# Patient Record
Sex: FEMALE | URBAN - METROPOLITAN AREA
[De-identification: names, ages, dates, MRNs, and addresses within clinical notes are randomized per-mention and may not be internally consistent; named-entity substitution may affect disease eponyms.]

---

## 2023-05-26 ENCOUNTER — OFFICE VISIT (OUTPATIENT)
Facility: LOCATION | Age: 58
End: 2023-05-26
Payer: COMMERCIAL

## 2023-05-26 DIAGNOSIS — H40.1122 PRIMARY OPEN-ANGLE GLAUCOMA LEFT EYE MODERATE STAGE: Primary | ICD-10-CM

## 2023-05-26 DIAGNOSIS — H04.123 DRY EYE SYNDROME OF BILATERAL LACRIMAL GLANDS: ICD-10-CM

## 2023-05-26 DIAGNOSIS — H25.13 AGE-RELATED NUCLEAR CATARACT, BILATERAL: ICD-10-CM

## 2023-05-26 DIAGNOSIS — H40.1111 PRIMARY OPEN-ANGLE GLAUCOMA RIGHT EYE MILD STAGE: ICD-10-CM

## 2023-05-26 DIAGNOSIS — H02.89 OTHER SPECIFIED DISORDERS OF EYELID: ICD-10-CM

## 2023-05-26 DIAGNOSIS — H47.021 HEMORRHAGE IN OPTIC NERVE SHEATH, RIGHT EYE: ICD-10-CM

## 2023-05-26 PROCEDURE — 92133 CPTRZD OPH DX IMG PST SGM ON: CPT | Performed by: STUDENT IN AN ORGANIZED HEALTH CARE EDUCATION/TRAINING PROGRAM

## 2023-05-26 PROCEDURE — 99214 OFFICE O/P EST MOD 30 MIN: CPT | Performed by: STUDENT IN AN ORGANIZED HEALTH CARE EDUCATION/TRAINING PROGRAM

## 2023-05-26 ASSESSMENT — INTRAOCULAR PRESSURE
OS: 9
OD: 9

## 2023-05-26 NOTE — IMPRESSION/PLAN
Impression: NVS. No surgery indicated at this time. Plan: Will revisit once patient is visually bothered by cataracts.

## 2023-05-26 NOTE — IMPRESSION/PLAN
Impression: Examination revealed papilledema. OD heme noted on this exam today. Plan: Pressure is excellent no treatment indicated will monitor closely.

## 2023-05-26 NOTE — IMPRESSION/PLAN
Impression: CC: Patient presents today for 3 month f/u w/ OCT/ONH OU. Patient late by 2 months. HPI 9/2022: Patient states sleep studies were done with PCP - Results were moderate and is now using C PAP. POHx: POAG mild OD, moderate OS, Myopia OU, SCL wearer, Floppy eyelids OU, h/x of eye infection at age 12 OS (describes as a blister). FOHx: Negative to glaucoma. PMHx: Arthritis. Eye med: Latanoprost QHS OU (started 1/2021). TMAX: Not established  Plan: Testing:
OCT/ONH 5/2022: OD unable NFL, thin GCC, OS thin ST>IT. OD stable GCC, OS stable NFL/GCC. OCT/ONH 05/2023: OD borderline S/T thin I/T and T, no progression, OS thin s>I, possible progression I/T but fluctuate, no definite progression. HVF 24-2 9/2022: OU reliable. OD WNL, OS dense INS/IAS. Stable OU. HVF 10-2 9/2022: OS only, reliable. mild IN depression. Baseline. Pachy: 562/546. Gonio 5/2022: SS, 1+ pigment 360 OU. Today: 
IOP acceptable OU Informed patient testing and examination today revealed no concerning signs for glaucoma progression. Will continue to monitor with current medication. Emphasized the importance of routine care. Patient expressed understanding.  

Plan: 
Continue Latanoprost QHS OU

RTC in 6 months for HVF 24-2 OU and DFE OU w/ Dr. José Moore

## 2023-05-26 NOTE — IMPRESSION/PLAN
Impression: Examination revealed dry eye syndrome secondary to tear deficiencies. Patient reports improvement to symptoms. 2+ SPK OS Plan: Continue AT's TID OU Continue Hot compresses BID OU

## 2023-05-26 NOTE — IMPRESSION/PLAN
Impression: floppy eyelids OU, (+) snoring, patient does not know if she has sleep apnea. Sleep Studies done - Results are moderate. Patient is currently using C PAP. Plan: Care ongoing.

## 2023-12-13 ENCOUNTER — OFFICE VISIT (OUTPATIENT)
Facility: LOCATION | Age: 58
End: 2023-12-13
Payer: COMMERCIAL

## 2023-12-13 DIAGNOSIS — H40.1111 PRIMARY OPEN-ANGLE GLAUCOMA RIGHT EYE MILD STAGE: ICD-10-CM

## 2023-12-13 DIAGNOSIS — H02.89 OTHER SPECIFIED DISORDERS OF EYELID: ICD-10-CM

## 2023-12-13 DIAGNOSIS — H25.13 AGE-RELATED NUCLEAR CATARACT, BILATERAL: ICD-10-CM

## 2023-12-13 DIAGNOSIS — H40.1122 PRIMARY OPEN-ANGLE GLAUCOMA LEFT EYE MODERATE STAGE: Primary | ICD-10-CM

## 2023-12-13 DIAGNOSIS — H04.123 DRY EYE SYNDROME OF BILATERAL LACRIMAL GLANDS: ICD-10-CM

## 2023-12-13 DIAGNOSIS — H47.021 HEMORRHAGE IN OPTIC NERVE SHEATH, RIGHT EYE: ICD-10-CM

## 2023-12-13 PROCEDURE — 92083 EXTENDED VISUAL FIELD XM: CPT | Performed by: STUDENT IN AN ORGANIZED HEALTH CARE EDUCATION/TRAINING PROGRAM

## 2023-12-13 PROCEDURE — 99214 OFFICE O/P EST MOD 30 MIN: CPT | Performed by: STUDENT IN AN ORGANIZED HEALTH CARE EDUCATION/TRAINING PROGRAM

## 2023-12-13 ASSESSMENT — INTRAOCULAR PRESSURE
OD: 9
OS: 8
OS: 11
OD: 11

## 2024-06-28 ENCOUNTER — OFFICE VISIT (OUTPATIENT)
Facility: LOCATION | Age: 59
End: 2024-06-28
Payer: COMMERCIAL

## 2024-06-28 DIAGNOSIS — H40.1122 PRIMARY OPEN-ANGLE GLAUCOMA LEFT EYE MODERATE STAGE: Primary | ICD-10-CM

## 2024-06-28 DIAGNOSIS — H04.123 DRY EYE SYNDROME OF BILATERAL LACRIMAL GLANDS: ICD-10-CM

## 2024-06-28 DIAGNOSIS — H40.1111 PRIMARY OPEN-ANGLE GLAUCOMA RIGHT EYE MILD STAGE: ICD-10-CM

## 2024-06-28 PROCEDURE — 99214 OFFICE O/P EST MOD 30 MIN: CPT | Performed by: STUDENT IN AN ORGANIZED HEALTH CARE EDUCATION/TRAINING PROGRAM

## 2024-06-28 PROCEDURE — 92133 CPTRZD OPH DX IMG PST SGM ON: CPT | Performed by: STUDENT IN AN ORGANIZED HEALTH CARE EDUCATION/TRAINING PROGRAM

## 2024-06-28 ASSESSMENT — INTRAOCULAR PRESSURE
OS: 9
OD: 9
OS: 8
OD: 12

## 2024-10-25 ENCOUNTER — OFFICE VISIT (OUTPATIENT)
Facility: LOCATION | Age: 59
End: 2024-10-25
Payer: COMMERCIAL

## 2024-10-25 DIAGNOSIS — H04.123 DRY EYE SYNDROME OF BILATERAL LACRIMAL GLANDS: ICD-10-CM

## 2024-10-25 DIAGNOSIS — H40.1111 PRIMARY OPEN-ANGLE GLAUCOMA RIGHT EYE MILD STAGE: ICD-10-CM

## 2024-10-25 DIAGNOSIS — H40.1122 PRIMARY OPEN-ANGLE GLAUCOMA LEFT EYE MODERATE STAGE: Primary | ICD-10-CM

## 2024-10-25 PROCEDURE — 92133 CPTRZD OPH DX IMG PST SGM ON: CPT | Performed by: STUDENT IN AN ORGANIZED HEALTH CARE EDUCATION/TRAINING PROGRAM

## 2024-10-25 PROCEDURE — 99214 OFFICE O/P EST MOD 30 MIN: CPT | Performed by: STUDENT IN AN ORGANIZED HEALTH CARE EDUCATION/TRAINING PROGRAM

## 2024-10-25 PROCEDURE — 92083 EXTENDED VISUAL FIELD XM: CPT | Performed by: STUDENT IN AN ORGANIZED HEALTH CARE EDUCATION/TRAINING PROGRAM

## 2024-10-25 RX ORDER — LATANOPROST 50 UG/ML
0.005 % SOLUTION OPHTHALMIC
Qty: 7.5 | Refills: 4 | Status: ACTIVE
Start: 2024-10-25

## 2024-10-25 ASSESSMENT — INTRAOCULAR PRESSURE
OS: 11
OD: 13

## 2025-02-20 ENCOUNTER — OFFICE VISIT (OUTPATIENT)
Facility: LOCATION | Age: 60
End: 2025-02-20
Payer: COMMERCIAL

## 2025-02-20 DIAGNOSIS — H40.1122 PRIMARY OPEN-ANGLE GLAUCOMA LEFT EYE MODERATE STAGE: Primary | ICD-10-CM

## 2025-02-20 DIAGNOSIS — H04.123 DRY EYE SYNDROME OF BILATERAL LACRIMAL GLANDS: ICD-10-CM

## 2025-02-20 DIAGNOSIS — H40.1111 PRIMARY OPEN-ANGLE GLAUCOMA RIGHT EYE MILD STAGE: ICD-10-CM

## 2025-02-20 PROCEDURE — 99213 OFFICE O/P EST LOW 20 MIN: CPT | Performed by: STUDENT IN AN ORGANIZED HEALTH CARE EDUCATION/TRAINING PROGRAM

## 2025-02-20 ASSESSMENT — INTRAOCULAR PRESSURE
OD: 10
OS: 12
OS: 11

## 2025-06-10 ENCOUNTER — OFFICE VISIT (OUTPATIENT)
Facility: LOCATION | Age: 60
End: 2025-06-10
Payer: COMMERCIAL

## 2025-06-10 DIAGNOSIS — H40.1122 PRIMARY OPEN-ANGLE GLAUCOMA LEFT EYE MODERATE STAGE: Primary | ICD-10-CM

## 2025-06-10 DIAGNOSIS — H04.123 DRY EYE SYNDROME OF BILATERAL LACRIMAL GLANDS: ICD-10-CM

## 2025-06-10 DIAGNOSIS — H40.1111 PRIMARY OPEN-ANGLE GLAUCOMA RIGHT EYE MILD STAGE: ICD-10-CM

## 2025-06-10 PROCEDURE — 92134 CPTRZ OPH DX IMG PST SGM RTA: CPT | Performed by: STUDENT IN AN ORGANIZED HEALTH CARE EDUCATION/TRAINING PROGRAM

## 2025-06-10 PROCEDURE — 92133 CPTRZD OPH DX IMG PST SGM ON: CPT | Performed by: STUDENT IN AN ORGANIZED HEALTH CARE EDUCATION/TRAINING PROGRAM

## 2025-06-10 PROCEDURE — 99214 OFFICE O/P EST MOD 30 MIN: CPT | Performed by: STUDENT IN AN ORGANIZED HEALTH CARE EDUCATION/TRAINING PROGRAM

## 2025-06-10 ASSESSMENT — INTRAOCULAR PRESSURE
OD: 11
OD: 10
OS: 11

## 2025-06-10 ASSESSMENT — VISUAL ACUITY
OS: 20/30
OD: 20/30